# Patient Record
(demographics unavailable — no encounter records)

---

## 2017-11-15 NOTE — RAD
CERVICAL SPINE 2 VIEWS:

 

HISTORY: 

Neck pain.

 

FINDINGS: 

Vertebral bodies are normal in height.  There are degenerative osteophytes and mild disk narrowing at
 C4-5, C5-6, and C6-7.  There is no soft tissue swelling.  Carotid bulb calcifications are noted.

 

IMPRESSION: 

Mild arthritic changes of the spine.

 

POS: [facility]

## 2017-11-15 NOTE — RAD
CHEST 2 VIEWS:

 

HISTORY: 

Dyspnea.

 

FINDINGS: 

Heart size is enlarged.  There are atherosclerotic changes of the aorta.  The lungs are clear of infi
ltrates.  There are no signs of failure.

 

IMPRESSION: 

Cardiomegaly.

 

POS: [facility]

## 2017-11-15 NOTE — RAD
SKULL SERIES 3 VIEWS:

 

HISTORY: 

Head injury.

 

FINDINGS: 

The cranial vault is intact.  Sella turcica is normal in appearance.  No lytic or blastic bony change
.

 

IMPRESSION: 

Negative skull series.

 

POS: [facility]I

## 2017-11-15 NOTE — MRI
MRI ABDOMEN WITH AND WITHOUT IV CONTRAST:

 

HISTORY: 

Abdomen and GI tract imaging, cirrhosis, ascites, weight loss, chronic hepatitis C, mass in the left 
adrenal gland on recent CT scan.

 

FINDINGS: 

Correlation is made with the CT abdomen and pelvis dated 10/30/17 from [facility].

 

Exam was markedly limited due to motion artifact.

 

There is free fluid consistent with ascites.  Gallbladder is present.  There are small cysts in the k
idneys.  The spleen is not enlarged and appears unremarkable.  Pancreas and right adrenal gland also 
appear unremarkable.  There is an approximately 6.5 cm left adrenal mass which appears cystic and sep
tated.  In the posterior aspect of this mass is a 2 cm mural nodule which appears to enhance.  

 

No aneurysmal dilatation of the abdominal aorta is seen.  The bone marrow signal is normal.

 

IMPRESSION: 

1.  Limited exam.

2.  Cirrhosis of the liver.

3.  Ascites.

4.  Small renal cyst.

5.  Large complex left adrenal mass with cystic components, septations, and enhancing mural nodule, s
uspicious for malignancy.

 

The study was interpreted in consultation with Dr. Dada Serna, who concurs.

 

POS: [facility]LA